# Patient Record
Sex: FEMALE | URBAN - METROPOLITAN AREA
[De-identification: names, ages, dates, MRNs, and addresses within clinical notes are randomized per-mention and may not be internally consistent; named-entity substitution may affect disease eponyms.]

---

## 2017-11-01 ENCOUNTER — APPOINTMENT (RX ONLY)
Dept: URBAN - METROPOLITAN AREA CLINIC 126 | Facility: CLINIC | Age: 62
Setting detail: DERMATOLOGY
End: 2017-11-01

## 2017-11-01 ENCOUNTER — RX ONLY (OUTPATIENT)
Age: 62
Setting detail: RX ONLY
End: 2017-11-01

## 2017-11-01 DIAGNOSIS — Z41.9 ENCOUNTER FOR PROCEDURE FOR PURPOSES OTHER THAN REMEDYING HEALTH STATE, UNSPECIFIED: ICD-10-CM

## 2017-11-01 PROCEDURE — ? MEDICAL CONSULTATION: PRODUCTS

## 2017-11-01 PROCEDURE — ? MEDICAL CONSULTATION: CHEMICAL PEELS

## 2017-11-01 PROCEDURE — ? MEDICAL CONSULTATION: FILLERS

## 2017-11-01 PROCEDURE — ? MEDICAL CONSULTATION: ULTHERAPY

## 2017-11-01 PROCEDURE — ? MEDICAL CONSULTATION: BROWN SPOTS

## 2017-11-01 RX ORDER — HYDROQUINONE 4 %
CREAM (GRAM) TOPICAL
Qty: 1 | Refills: 0 | COMMUNITY
Start: 2017-11-01

## 2017-11-03 ENCOUNTER — APPOINTMENT (RX ONLY)
Dept: URBAN - METROPOLITAN AREA CLINIC 124 | Facility: CLINIC | Age: 62
Setting detail: DERMATOLOGY
End: 2017-11-03

## 2017-11-03 DIAGNOSIS — Z41.9 ENCOUNTER FOR PROCEDURE FOR PURPOSES OTHER THAN REMEDYING HEALTH STATE, UNSPECIFIED: ICD-10-CM

## 2017-11-03 PROCEDURE — ? ULTHERAPY

## 2017-11-03 NOTE — PROCEDURE: ULTHERAPY
Post-Procedures Photographs: No
Total Lines (Use Numbers Only, No Special Characters Or $): Keshawn Ivy 318
Detail Level: Detailed
Pre-Procedures Photographs: Yes
Anesthesia Volume In Cc: 6
Consent: Written consent obtained, risks reviewed including but not limited to crusting, scabbing, blistering, scarring, darker or lighter pigmentary change, and/or incomplete improvement.
Post-Care Instructions: I reviewed with the patient in detail post-care instructions. Patient should stay away from the sun and wear sun protection until treated areas are fully healed.
Patient Reported Discomfort: 0
Anesthesia Type: 1% lidocaine with epinephrine